# Patient Record
(demographics unavailable — no encounter records)

---

## 2025-02-06 NOTE — PHYSICAL EXAM
[Well Developed] : well developed [Well Nourished] : well nourished [No Acute Distress] : no acute distress [Normal Conjunctiva] : normal conjunctiva [Normal Venous Pressure] : normal venous pressure [No Carotid Bruit] : no carotid bruit [Normal S1, S2] : normal S1, S2 [No Gallop] : no gallop [Clear Lung Fields] : clear lung fields [Good Air Entry] : good air entry [No Respiratory Distress] : no respiratory distress  [Soft] : abdomen soft [Non Tender] : non-tender [No Masses/organomegaly] : no masses/organomegaly [Normal Bowel Sounds] : normal bowel sounds [Normal Gait] : normal gait [No Edema] : no edema [No Cyanosis] : no cyanosis [No Clubbing] : no clubbing [No Varicosities] : no varicosities [No Rash] : no rash [No Skin Lesions] : no skin lesions [Moves all extremities] : moves all extremities [No Focal Deficits] : no focal deficits [Normal Speech] : normal speech [Alert and Oriented] : alert and oriented [Normal memory] : normal memory [Murmur] : murmur [de-identified] : systolic flowing murmur; evidence of cardiomegaly

## 2025-02-06 NOTE — DISCUSSION/SUMMARY
[FreeTextEntry1] : 61 yo lady with PMHx of HTN and HLD who presents as a new patient  EKG 02/06/25 NSR, nonspecific ST and T wave changes  Assessment: 1. HTN - uncontrolled 2. HLD 3. Abnormal EKG 4. Systolic murmur on exam (LUSB 3/6) and possible cardiomegaly   Plan: 1. TTE recommended, patient said she wants to think about it 2. Amlodipine 10mg PO QD and losartan 25mg PO QD (added today) 3. Patient said she will arrange f/u later    During non face-to-face time, I reviewed relevant portions of the patients medical record. During face-to-face time, I took a relevant history and examined the patient. I also explained differential diagnoses, relevant cardiac diagnoses, workup, and management plan, which required a moderate level of medical decision making. I answered all questions related to the patient's medical conditions.   Nikkie SEE (John)  of Cardiology [facility] [employer] [facility] at [facility]        [EKG obtained to assist in diagnosis and management of assessed problem(s)] : EKG obtained to assist in diagnosis and management of assessed problem(s)

## 2025-02-06 NOTE — HISTORY OF PRESENT ILLNESS
[FreeTextEntry1] : 61 yo lady with PMHx of HTN and HLD who presents as a new patient  02/06/25 ROS negative